# Patient Record
Sex: MALE | Race: AMERICAN INDIAN OR ALASKA NATIVE
[De-identification: names, ages, dates, MRNs, and addresses within clinical notes are randomized per-mention and may not be internally consistent; named-entity substitution may affect disease eponyms.]

---

## 2018-06-30 ENCOUNTER — HOSPITAL ENCOUNTER (EMERGENCY)
Dept: HOSPITAL 5 - ED | Age: 23
Discharge: HOME | End: 2018-06-30
Payer: SELF-PAY

## 2018-06-30 VITALS — SYSTOLIC BLOOD PRESSURE: 126 MMHG | DIASTOLIC BLOOD PRESSURE: 72 MMHG

## 2018-06-30 DIAGNOSIS — F12.10: ICD-10-CM

## 2018-06-30 DIAGNOSIS — K04.7: Primary | ICD-10-CM

## 2018-06-30 DIAGNOSIS — F17.200: ICD-10-CM

## 2018-06-30 PROCEDURE — 99282 EMERGENCY DEPT VISIT SF MDM: CPT

## 2018-06-30 NOTE — EMERGENCY DEPARTMENT REPORT
ED ENT HPI





- General


Chief complaint: Dental/Oral


Stated complaint: MOUTH PAIN


Time Seen by Provider: 06/30/18 05:27


Source: patient


Mode of arrival: Ambulatory


Limitations: No Limitations





- History of Present Illness


Initial comments: 





22-year-old  male comes in complaining of pain and swelling to 

his left lower jaw onset last night.  Patient denies any fever or chills.  He 

is aware that he has a bad tooth in his mouth.  He reports no known drug 

allergies currently takes no medications on a daily basis and has no past 

medical history.


MD complaint: tooth pain


-: days(s) (1), Last night


Location: tooth # (21)


Severity: severe


Severity scale (0 -10): 10


Quality: stabbing, aching, sharp


Consistency: constant


Improves with: none


Worsens with: eating, movement


Associated Symptoms: gum swelling, toothache.  denies: fever, cough, sore throat

, tinnitus





- Related Data


 Previous Rx's











 Medication  Instructions  Recorded  Last Taken  Type


 


Amoxicillin [Trimox CAP] 500 mg PO BID #20 capsule 06/30/18 Unknown Rx


 


Ibuprofen [Motrin 800 MG tab] 800 mg PO Q8HR PRN #30 tablet 06/30/18 Unknown Rx


 


traMADol [Ultram 50 MG tab] 50 mg PO Q6HR PRN #16 tablet 06/30/18 Unknown Rx











 Allergies











Allergy/AdvReac Type Severity Reaction Status Date / Time


 


No Known Allergies Allergy   Unverified 06/30/18 01:09














ED Dental HPI





- General


Chief complaint: Dental/Oral


Stated complaint: MOUTH PAIN


Time Seen by Provider: 06/30/18 05:27


Source: patient


Mode of arrival: Ambulatory


Limitations: No Limitations





- Related Data


 Previous Rx's











 Medication  Instructions  Recorded  Last Taken  Type


 


Amoxicillin [Trimox CAP] 500 mg PO BID #20 capsule 06/30/18 Unknown Rx


 


Ibuprofen [Motrin 800 MG tab] 800 mg PO Q8HR PRN #30 tablet 06/30/18 Unknown Rx


 


traMADol [Ultram 50 MG tab] 50 mg PO Q6HR PRN #16 tablet 06/30/18 Unknown Rx











 Allergies











Allergy/AdvReac Type Severity Reaction Status Date / Time


 


No Known Allergies Allergy   Unverified 06/30/18 01:09














ED Review of Systems


ROS: 


Stated complaint: MOUTH PAIN


Other details as noted in HPI





ENT: dental pain





ED Past Medical Hx





- Past Medical History


Previous Medical History?: No





- Surgical History


Past Surgical History?: No





- Social History


Smoking Status: Current Every Day Smoker


Substance Use Type: Marijuana





- Medications


Home Medications: 


 Home Medications











 Medication  Instructions  Recorded  Confirmed  Last Taken  Type


 


Amoxicillin [Trimox CAP] 500 mg PO BID #20 capsule 06/30/18  Unknown Rx


 


Ibuprofen [Motrin 800 MG tab] 800 mg PO Q8HR PRN #30 tablet 06/30/18  Unknown Rx


 


traMADol [Ultram 50 MG tab] 50 mg PO Q6HR PRN #16 tablet 06/30/18  Unknown Rx














ED Physical Exam





- General


Limitations: No Limitations





- Expanded ENT Exam


  ** Expanded


Teeth exam: Present: dental tenderness # (21), gingival enlargement, other (

left lower jaw swelling and tenderness)





- Neck


Neck exam: Present: normal inspection





ED Course





 Vital Signs











  06/30/18 06/30/18





  01:01 01:10


 


Temperature 98.5 F 98.5 F


 


Pulse Rate 102 H 102 H


 


Respiratory 18 18





Rate  


 


Blood Pressure 128/72 128/72


 


O2 Sat by Pulse 99 99





Oximetry  














ED Medical Decision Making





- Medical Decision Making





Patient has been evaluated by this provider fast track.


Percocet and amoxicillin given for pain management and starting of antibiotics.


Will discharge patient on ibuprofen and tramadol and amoxicillin.


Referral to dentistry.


Critical care attestation.: 


If time is entered above; I have spent that time in minutes in the direct care 

of this critically ill patient, excluding procedure time.








ED Disposition


Clinical Impression: 


 Abscess, dental





Disposition: DC-01 TO HOME OR SELFCARE


Is pt being admited?: No


Does the pt Need Aspirin: No


Condition: Stable


Instructions:  Dental Abscess (ED)


Additional Instructions: 


Complete antibiotics and take pain medication as needed.  Follow-up with dental.


Prescriptions: 


Amoxicillin [Trimox CAP] 500 mg PO BID #20 capsule


Ibuprofen [Motrin 800 MG tab] 800 mg PO Q8HR PRN #30 tablet


 PRN Reason: Pain , Severe (7-10)


traMADol [Ultram 50 MG tab] 50 mg PO Q6HR PRN #16 tablet


 PRN Reason: Pain


Referrals: 


PRIMARY CARE,MD [Primary Care Provider] - 3-5 Days


[facility] [Provider Group] - 3-5 Days


[facility] [Outside] - 3-5 Days


[facility] [Outside] - 3-5 Days


Forms:  Work/School Release Form(ED)

## 2019-11-07 ENCOUNTER — HOSPITAL ENCOUNTER (EMERGENCY)
Dept: HOSPITAL 5 - ED | Age: 24
Discharge: HOME | End: 2019-11-07
Payer: SELF-PAY

## 2019-11-07 VITALS — SYSTOLIC BLOOD PRESSURE: 128 MMHG | DIASTOLIC BLOOD PRESSURE: 62 MMHG

## 2019-11-07 DIAGNOSIS — F17.200: ICD-10-CM
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
## 2019-11-07 DIAGNOSIS — Z79.899: ICD-10-CM

## 2019-11-07 DIAGNOSIS — S29.012A: Primary | ICD-10-CM

## 2019-11-07 DIAGNOSIS — M62.830: ICD-10-CM
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
## 2019-11-07 PROCEDURE — 99283 EMERGENCY DEPT VISIT LOW MDM: CPT

## 2019-11-07 PROCEDURE — 96372 THER/PROPH/DIAG INJ SC/IM: CPT

## 2019-11-07 PROCEDURE — 72040 X-RAY EXAM NECK SPINE 2-3 VW: CPT
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
## 2019-11-07 NOTE — EMERGENCY DEPARTMENT REPORT
Blank Doc





- Documentation


Documentation: 





24-year-old male that presents with acute neck pain.  Denies any injuries.





This initial assessment/diagnostic orders/clinical plan/treatment(s) is/are 

subject to change based on patient's health status, clinical progression and re-

assessment by fellow clinical providers in the ED.  Further treatment and workup

at subsequent clinical providers discretion.  Patient/guardians urged not to 

elope from the ED as their condition may be serious if not clinically assessed 

and managed.  Initial orders include:


1- Patient sent to [facility] for further evaluation and treatment


2- xrays
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
